# Patient Record
Sex: MALE | NOT HISPANIC OR LATINO | Employment: FULL TIME | URBAN - METROPOLITAN AREA
[De-identification: names, ages, dates, MRNs, and addresses within clinical notes are randomized per-mention and may not be internally consistent; named-entity substitution may affect disease eponyms.]

---

## 2020-02-27 ENCOUNTER — OFFICE VISIT (OUTPATIENT)
Dept: RETAIL CLINIC | Facility: CLINIC | Age: 35
End: 2020-02-27

## 2020-02-27 VITALS
SYSTOLIC BLOOD PRESSURE: 103 MMHG | TEMPERATURE: 98.3 F | OXYGEN SATURATION: 98 % | HEART RATE: 60 BPM | RESPIRATION RATE: 20 BRPM | DIASTOLIC BLOOD PRESSURE: 61 MMHG

## 2020-02-27 DIAGNOSIS — J06.9 ACUTE URI: Primary | ICD-10-CM

## 2020-02-27 PROCEDURE — 99213 OFFICE O/P EST LOW 20 MIN: CPT | Performed by: NURSE PRACTITIONER

## 2020-02-27 RX ORDER — BROMPHENIRAMINE MALEATE, PSEUDOEPHEDRINE HYDROCHLORIDE, AND DEXTROMETHORPHAN HYDROBROMIDE 2; 30; 10 MG/5ML; MG/5ML; MG/5ML
SYRUP ORAL
Qty: 240 ML | Refills: 0 | Status: SHIPPED | OUTPATIENT
Start: 2020-02-27 | End: 2023-02-08

## 2020-02-27 RX ORDER — GUAIFENESIN 600 MG/1
600 TABLET, EXTENDED RELEASE ORAL 2 TIMES DAILY
Qty: 28 TABLET | Refills: 0 | Status: SHIPPED | OUTPATIENT
Start: 2020-02-27 | End: 2020-03-12

## 2020-02-27 RX ORDER — PREDNISONE 20 MG/1
20 TABLET ORAL 2 TIMES DAILY
Qty: 10 TABLET | Refills: 0 | Status: SHIPPED | OUTPATIENT
Start: 2020-02-27 | End: 2023-02-08

## 2020-02-27 RX ORDER — NAPROXEN SODIUM 220 MG
220 TABLET ORAL 2 TIMES DAILY PRN
COMMUNITY
End: 2023-02-08

## 2020-02-27 NOTE — PROGRESS NOTES
Subjective:     Arsen HartmannChantelle II is a 35 y.o.     URI    The current episode started yesterday. Associated symptoms include sinus pain. Pertinent negatives include no congestion, ear pain, headaches, sore throat or wheezing. Cough: realted to post nasal drainage. He has tried acetaminophen (dayquil) for the symptoms. The treatment provided no relief.         The following portions of the patient's history were reviewed and updated as appropriate: allergies, current medications, past family history, past medical history, past social history, past surgical history and problem list.      Review of Systems   Constitutional: Positive for fatigue. Negative for fever.   HENT: Positive for postnasal drip and sinus pain. Negative for congestion, ear pain and sore throat.    Respiratory: Negative for shortness of breath and wheezing. Cough: realted to post nasal drainage.    Cardiovascular: Negative.    Musculoskeletal: Positive for myalgias.   Neurological: Negative for headaches.         Objective:      Physical Exam   Constitutional: He appears well-developed and well-nourished.   HENT:   Head: Normocephalic and atraumatic.   Right Ear: Tympanic membrane and ear canal normal.   Left Ear: Tympanic membrane and ear canal normal.   Nose: Right sinus exhibits frontal sinus tenderness. Right sinus exhibits no maxillary sinus tenderness. Left sinus exhibits frontal sinus tenderness. Left sinus exhibits no maxillary sinus tenderness.   Mouth/Throat: Posterior oropharyngeal erythema present. No oropharyngeal exudate.   Mildly edematous nasal turbinates   Cardiovascular: Normal rate, regular rhythm, S1 normal, S2 normal and normal heart sounds.   Pulmonary/Chest: Effort normal and breath sounds normal.   Lymphadenopathy:     He has cervical adenopathy.   Vitals reviewed.          Arsen was seen today for sinusitis.    Diagnoses and all orders for this visit:    Acute URI    Other orders  -     predniSONE (DELTASONE)  20 MG tablet; Take 1 tablet by mouth 2 (Two) Times a Day.  -     guaiFENesin (MUCINEX) 600 MG 12 hr tablet; Take 1 tablet by mouth 2 (Two) Times a Day for 14 days.  -     brompheniramine-pseudoephedrine-DM (BROMFED DM) 30-2-10 MG/5ML syrup; 5 to 10 cc every 4 hours as needed for cough, congestion, allergies

## 2021-04-16 ENCOUNTER — BULK ORDERING (OUTPATIENT)
Dept: CASE MANAGEMENT | Facility: OTHER | Age: 36
End: 2021-04-16

## 2021-04-16 DIAGNOSIS — Z23 IMMUNIZATION DUE: ICD-10-CM
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
## 2023-02-08 ENCOUNTER — OFFICE VISIT (OUTPATIENT)
Dept: FAMILY MEDICINE CLINIC | Facility: CLINIC | Age: 38
End: 2023-02-08
Payer: COMMERCIAL

## 2023-02-08 VITALS
HEART RATE: 60 BPM | TEMPERATURE: 97.1 F | OXYGEN SATURATION: 100 % | BODY MASS INDEX: 26.15 KG/M2 | DIASTOLIC BLOOD PRESSURE: 72 MMHG | SYSTOLIC BLOOD PRESSURE: 104 MMHG | WEIGHT: 162 LBS

## 2023-02-08 DIAGNOSIS — E55.9 VITAMIN D DEFICIENCY: ICD-10-CM

## 2023-02-08 DIAGNOSIS — Z23 NEED FOR VACCINATION: ICD-10-CM

## 2023-02-08 DIAGNOSIS — F41.9 ANXIETY: ICD-10-CM

## 2023-02-08 DIAGNOSIS — F90.2 ATTENTION DEFICIT HYPERACTIVITY DISORDER (ADHD), COMBINED TYPE: Primary | ICD-10-CM

## 2023-02-08 DIAGNOSIS — Z00.00 ROUTINE HEALTH MAINTENANCE: ICD-10-CM

## 2023-02-08 DIAGNOSIS — R53.83 OTHER FATIGUE: ICD-10-CM

## 2023-02-08 DIAGNOSIS — Z13.220 SCREENING FOR LIPID DISORDERS: ICD-10-CM

## 2023-02-08 DIAGNOSIS — Z11.59 NEED FOR HEPATITIS C SCREENING TEST: ICD-10-CM

## 2023-02-08 DIAGNOSIS — R10.13 DYSPEPSIA: ICD-10-CM

## 2023-02-08 DIAGNOSIS — K21.9 GASTROESOPHAGEAL REFLUX DISEASE, UNSPECIFIED WHETHER ESOPHAGITIS PRESENT: ICD-10-CM

## 2023-02-08 PROCEDURE — 90715 TDAP VACCINE 7 YRS/> IM: CPT | Performed by: INTERNAL MEDICINE

## 2023-02-08 PROCEDURE — 90471 IMMUNIZATION ADMIN: CPT | Performed by: INTERNAL MEDICINE

## 2023-02-08 PROCEDURE — 99214 OFFICE O/P EST MOD 30 MIN: CPT | Performed by: INTERNAL MEDICINE

## 2023-02-08 NOTE — ASSESSMENT & PLAN NOTE
Tdap today, hepatitis C screening today, lipid screening today.  Normal blood pressure  No need for early colon or prostate cancer screening.
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
## 2023-02-08 NOTE — PATIENT INSTRUCTIONS
It was so nice meeting you today. I look forward to working with you on a plan to get/keep you healthy and happy!    Please attempt to obtain records from psychiatrist regarding your ADHD diagnosis, including any testing.    Keep a food diary of foods that are bothering your GI tract. You can take famotidine or ranitidine for your symptoms. Please let me know if you continue to have symptoms and we can investigate further.

## 2023-02-08 NOTE — ASSESSMENT & PLAN NOTE
Patient states that was diagnosed in 2016 by a psychiatrist.  I have asked him to see if he can obtain those records to begin of documentation of the diagnosis prior to consideration of pharmacologic therapy with a stimulant medication.    I explained to him that if we did prescribe stimulant medication in our practice, it would require a urine drug screen, controlled substance agreement, every 3 month visits.  I verbally explained the controlled substance agreement, but not have patient sign it until we have more information.  Asked patient to request records from his psychiatrist and have them sent our way.

## 2023-02-08 NOTE — ASSESSMENT & PLAN NOTE
Sounds like there may be 2 issues going on, possibly lactose intolerance as well as dyspepsia.  Recommended the patient keep a food diary of foods that irritate him.  Can try an H2 blocker for symptomatic relief.  If not improving or worsening, return to care.  CBC to evaluate for anemia.

## 2023-02-08 NOTE — ASSESSMENT & PLAN NOTE
May indeed be related to anxiety and ADHD.  Will check basic labs including CBC, CMP, vitamin D, TSH.